# Patient Record
Sex: FEMALE | Race: WHITE | NOT HISPANIC OR LATINO
[De-identification: names, ages, dates, MRNs, and addresses within clinical notes are randomized per-mention and may not be internally consistent; named-entity substitution may affect disease eponyms.]

---

## 2021-06-09 PROBLEM — Z00.00 ENCOUNTER FOR PREVENTIVE HEALTH EXAMINATION: Status: ACTIVE | Noted: 2021-06-09

## 2021-06-11 ENCOUNTER — APPOINTMENT (OUTPATIENT)
Dept: PULMONOLOGY | Facility: CLINIC | Age: 41
End: 2021-06-11
Payer: COMMERCIAL

## 2021-06-11 VITALS
DIASTOLIC BLOOD PRESSURE: 90 MMHG | OXYGEN SATURATION: 99 % | SYSTOLIC BLOOD PRESSURE: 124 MMHG | TEMPERATURE: 97 F | HEART RATE: 73 BPM

## 2021-06-11 DIAGNOSIS — U07.1 COVID-19: ICD-10-CM

## 2021-06-11 PROCEDURE — 99204 OFFICE O/P NEW MOD 45 MIN: CPT | Mod: 25

## 2021-06-11 PROCEDURE — 94010 BREATHING CAPACITY TEST: CPT

## 2021-06-11 PROCEDURE — 99072 ADDL SUPL MATRL&STAF TM PHE: CPT

## 2021-06-11 RX ORDER — MULTIVITAMIN
TABLET ORAL DAILY
Refills: 0 | Status: ACTIVE | COMMUNITY
Start: 2021-06-11

## 2021-06-11 NOTE — PHYSICAL EXAM
[No Acute Distress] : no acute distress [Normal Appearance] : normal appearance [Normal Rate/Rhythm] : normal rate/rhythm [No Resp Distress] : no resp distress [Clear to Auscultation Bilaterally] : clear to auscultation bilaterally [No Abnormalities] : no abnormalities [Benign] : benign [No Clubbing] : no clubbing [No Cyanosis] : no cyanosis [Normal Color/ Pigmentation] : normal color/ pigmentation [No Focal Deficits] : no focal deficits [Oriented x3] : oriented x3

## 2021-06-11 NOTE — HISTORY OF PRESENT ILLNESS
[Never] : never [TextBox_4] : the patient is 41 year F with COVID in 4/22/21 The patient has a hx of mild intermittent asthma  And has recurrent sx of chest tightness. 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

## 2021-06-11 NOTE — REASON FOR VISIT
[Initial] : an initial visit [Chest Pain] : chest pain [Asthma] : asthma [Cough] : cough [Shortness of Breath] : shortness of breath [Parent] : parent [TextBox_44] : post covid April 2021, [facility], pt is experiencing tightness in chest,just finished course of steroids

## 2021-06-11 NOTE — REVIEW OF SYSTEMS
[Recent Wt Gain (___ Lbs)] : ~T recent [unfilled] lb weight gain [Cough] : no cough [Chest Tightness] : chest tightness [Dyspnea] : dyspnea [Wheezing] : wheezing [Palpitations] : palpitations [Seasonal Allergies] : seasonal allergies [Tremor] : tremor [Depression] : no depression [Anxiety] : no anxiety [Panic Attacks] : no panic attacks [Diabetes] : no diabetes [Thyroid Problem] : no thyroid problem [Obesity] : no obesity [Negative] : Psychiatric [TextBox_3] : with the COVID  [TextBox_44] : with the inhaler [TextBox_122] : with medications

## 2021-06-11 NOTE — ASSESSMENT
[FreeTextEntry1] : 1) hx of COVID with residual Sx  she has a dry cough and a hx of asthma  2) She is concerned that she might have PNA or PE she had negative testing at [facility]   3) SPIROMETRY showed mild asthma   I will give her maintenance medication    4) Rx montelukast and Spiriva

## 2021-07-16 RX ORDER — ALBUTEROL SULFATE 90 UG/1
108 (90 BASE) INHALANT RESPIRATORY (INHALATION)
Qty: 2 | Refills: 3 | Status: ACTIVE | COMMUNITY
Start: 2021-06-11 | End: 1900-01-01

## 2021-08-11 ENCOUNTER — APPOINTMENT (OUTPATIENT)
Dept: PULMONOLOGY | Facility: CLINIC | Age: 41
End: 2021-08-11
Payer: COMMERCIAL

## 2021-08-11 ENCOUNTER — NON-APPOINTMENT (OUTPATIENT)
Age: 41
End: 2021-08-11

## 2021-08-11 VITALS
DIASTOLIC BLOOD PRESSURE: 70 MMHG | SYSTOLIC BLOOD PRESSURE: 121 MMHG | TEMPERATURE: 97.5 F | HEART RATE: 78 BPM | OXYGEN SATURATION: 98 %

## 2021-08-11 DIAGNOSIS — J45.909 UNSPECIFIED ASTHMA, UNCOMPLICATED: ICD-10-CM

## 2021-08-11 PROCEDURE — 99214 OFFICE O/P EST MOD 30 MIN: CPT

## 2021-08-11 NOTE — HISTORY OF PRESENT ILLNESS
[Never] : never [TextBox_4] : the patient is 41 year F with COVID in 4/22/21 The patient has a hx of mild intermittent asthma  And has recurrent sx of chest tightness. 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 \par \par 8/11/21 the patient is using the albuterol only occasionally she is on the maintenance medication of spiriva and montelukast  She has sx only on humid days

## 2021-08-11 NOTE — REVIEW OF SYSTEMS
[Recent Wt Gain (___ Lbs)] : ~T recent [unfilled] lb weight gain [Chest Tightness] : chest tightness [Dyspnea] : dyspnea [Wheezing] : wheezing [Palpitations] : palpitations [Seasonal Allergies] : seasonal allergies [Tremor] : tremor [Negative] : Psychiatric [Cough] : no cough [Depression] : no depression [Anxiety] : no anxiety [Panic Attacks] : no panic attacks [Diabetes] : no diabetes [Thyroid Problem] : no thyroid problem [Obesity] : no obesity [TextBox_3] : with the COVID  [TextBox_122] : with medications  [TextBox_44] : with the inhaler

## 2021-08-11 NOTE — ASSESSMENT
[FreeTextEntry1] : The patient has no residual of the COVID She will have the 1stt dose of the COVID  vaccination tomorrow  continue generic symbicort and Spiriva.  Patient is able to exercise on a regular basis and she only uses the albuterol inhaler occasionally the patient was concerned that she has increased symptoms with hot muggy weather but in general that would be considered good control based on her otherwise frequent use of the albuterol inhaler and her continued exercise program.

## 2021-08-11 NOTE — REASON FOR VISIT
[Follow-Up] : a follow-up visit [Asthma] : asthma [TextBox_44] : pt has a little cough due to humidity, 2 month f/u

## 2021-08-30 ENCOUNTER — APPOINTMENT (OUTPATIENT)
Dept: PULMONOLOGY | Facility: CLINIC | Age: 41
End: 2021-08-30

## 2022-04-20 ENCOUNTER — APPOINTMENT (OUTPATIENT)
Dept: PULMONOLOGY | Facility: CLINIC | Age: 42
End: 2022-04-20
Payer: COMMERCIAL

## 2022-04-20 VITALS
WEIGHT: 150 LBS | SYSTOLIC BLOOD PRESSURE: 110 MMHG | DIASTOLIC BLOOD PRESSURE: 85 MMHG | TEMPERATURE: 97.3 F | BODY MASS INDEX: 25.61 KG/M2 | HEIGHT: 64 IN | OXYGEN SATURATION: 99 % | HEART RATE: 81 BPM

## 2022-04-20 PROCEDURE — 99214 OFFICE O/P EST MOD 30 MIN: CPT

## 2022-04-20 RX ORDER — MONTELUKAST 10 MG/1
10 TABLET, FILM COATED ORAL DAILY
Qty: 90 | Refills: 3 | Status: ACTIVE | COMMUNITY
Start: 2022-04-20 | End: 1900-01-01

## 2022-04-20 RX ORDER — MONTELUKAST 10 MG/1
10 TABLET, FILM COATED ORAL DAILY
Qty: 30 | Refills: 6 | Status: DISCONTINUED | COMMUNITY
Start: 2021-06-11 | End: 2022-04-20

## 2022-04-20 RX ORDER — TIOTROPIUM BROMIDE INHALATION SPRAY 3.12 UG/1
2.5 SPRAY, METERED RESPIRATORY (INHALATION) DAILY
Qty: 3 | Refills: 0 | Status: ACTIVE | COMMUNITY
Start: 2021-06-18 | End: 1900-01-01

## 2022-04-20 NOTE — ASSESSMENT
[FreeTextEntry1] : The patient has no residual of the COVID She will have the 1stt dose of the COVID  vaccination tomorrow  continue generic symbicort and Spiriva.  Patient is able to exercise on a regular basis and she only uses the albuterol inhaler occasionally the patient was concerned that she has increased symptoms with hot muggy weather but in general that would be considered good control based on her otherwise frequent use of the albuterol inhaler and her continued exercise program.\par \par 4/20/22 The patient has stable controlled asthma and I will refill her medications.  The patient is fully vaccinated.  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  Patient is only taking Spiriva I suggested that she restart the montelukast for better control of her asthma on a long-term basis.  30 minutes time spent counseling reviewing medications physical and history

## 2022-04-20 NOTE — REASON FOR VISIT
[Follow-Up] : a follow-up visit [Asthma] : asthma [TextBox_44] : 8 months. Pt states sx vary with the weather pt only coughs and feels SOB when its cold or raining.

## 2022-04-20 NOTE — REVIEW OF SYSTEMS
[Recent Wt Gain (___ Lbs)] : ~T recent [unfilled] lb weight gain [Chest Tightness] : chest tightness [Dyspnea] : dyspnea [Wheezing] : wheezing [Palpitations] : palpitations [Seasonal Allergies] : seasonal allergies [Tremor] : tremor [Negative] : Psychiatric [Cough] : no cough [Depression] : no depression [Anxiety] : no anxiety [Panic Attacks] : no panic attacks [Diabetes] : no diabetes [Thyroid Problem] : no thyroid problem [Obesity] : no obesity [TextBox_3] : with the COVID  [TextBox_30] : clear [TextBox_44] : with the inhaler [TextBox_122] : with medications

## 2022-04-20 NOTE — HISTORY OF PRESENT ILLNESS
[Never] : never [TextBox_4] : the patient is 41 year F with COVID in 4/22/21 The patient has a hx of mild intermittent asthma  And has recurrent sx of chest tightness. 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 \par \par 8/11/21 the patient is using the albuterol only occasionally she is on the maintenance medication of spiriva and montelukast  She has sx only on humid days\par \par 4/20/22 The patient is feeling well and had a cold that she tolerated She is not taking Montelukast but is taking the spiriva  The patient has trouble with wet muggy conditions but has not had any acute exacerbations

## 2023-03-06 ENCOUNTER — OUTPATIENT (OUTPATIENT)
Dept: OUTPATIENT SERVICES | Facility: HOSPITAL | Age: 43
LOS: 1 days | End: 2023-03-06

## 2023-03-06 ENCOUNTER — APPOINTMENT (OUTPATIENT)
Dept: MRI IMAGING | Facility: CLINIC | Age: 43
End: 2023-03-06
Payer: COMMERCIAL

## 2023-03-06 DIAGNOSIS — Z00.8 ENCOUNTER FOR OTHER GENERAL EXAMINATION: ICD-10-CM

## 2023-03-06 PROCEDURE — 77049 MRI BREAST C-+ W/CAD BI: CPT | Mod: 26

## 2023-06-01 ENCOUNTER — APPOINTMENT (OUTPATIENT)
Dept: MAMMOGRAPHY | Facility: CLINIC | Age: 43
End: 2023-06-01

## 2024-03-19 ENCOUNTER — NON-APPOINTMENT (OUTPATIENT)
Age: 44
End: 2024-03-19